# Patient Record
Sex: FEMALE | Race: WHITE | NOT HISPANIC OR LATINO | Employment: OTHER | URBAN - METROPOLITAN AREA
[De-identification: names, ages, dates, MRNs, and addresses within clinical notes are randomized per-mention and may not be internally consistent; named-entity substitution may affect disease eponyms.]

---

## 2017-01-18 ENCOUNTER — TELEPHONE (OUTPATIENT)
Dept: OPHTHALMOLOGY | Facility: CLINIC | Age: 72
End: 2017-01-18

## 2017-01-18 DIAGNOSIS — H25.12 NUCLEAR SCLEROTIC CATARACT OF LEFT EYE: Primary | ICD-10-CM

## 2017-01-27 ENCOUNTER — OFFICE VISIT (OUTPATIENT)
Dept: OPHTHALMOLOGY | Facility: CLINIC | Age: 72
End: 2017-01-27
Payer: MEDICARE

## 2017-01-27 DIAGNOSIS — H40.033 ANATOMICAL NARROW ANGLE, BILATERAL: ICD-10-CM

## 2017-01-27 DIAGNOSIS — H57.03 SMALL PUPIL: ICD-10-CM

## 2017-01-27 DIAGNOSIS — Z96.1 PSEUDOPHAKIA OF RIGHT EYE: ICD-10-CM

## 2017-01-27 DIAGNOSIS — Z98.890 H/O LASER IRIDOTOMY: ICD-10-CM

## 2017-01-27 DIAGNOSIS — H52.03 HYPEROPIA WITH PRESBYOPIA OF BOTH EYES: ICD-10-CM

## 2017-01-27 DIAGNOSIS — H52.4 HYPEROPIA WITH PRESBYOPIA OF BOTH EYES: ICD-10-CM

## 2017-01-27 DIAGNOSIS — H25.12 NUCLEAR SCLEROTIC CATARACT OF LEFT EYE: Primary | ICD-10-CM

## 2017-01-27 DIAGNOSIS — Q11.2 NANOPHTHALMOS, BILATERAL: ICD-10-CM

## 2017-01-27 DIAGNOSIS — H25.12 SENILE NUCLEAR SCLEROSIS, LEFT: ICD-10-CM

## 2017-01-27 DIAGNOSIS — H52.31 ANISOMETROPIA: ICD-10-CM

## 2017-01-27 PROCEDURE — 99212 OFFICE O/P EST SF 10 MIN: CPT | Mod: PBBFAC | Performed by: OPHTHALMOLOGY

## 2017-01-27 PROCEDURE — 92136 OPHTHALMIC BIOMETRY: CPT | Mod: PBBFAC,LT | Performed by: OPHTHALMOLOGY

## 2017-01-27 PROCEDURE — 99499 UNLISTED E&M SERVICE: CPT | Mod: S$PBB,,, | Performed by: OPHTHALMOLOGY

## 2017-01-27 PROCEDURE — 99999 PR PBB SHADOW E&M-EST. PATIENT-LVL II: CPT | Mod: PBBFAC,,, | Performed by: OPHTHALMOLOGY

## 2017-01-27 NOTE — PROGRESS NOTES
HPI     DLS: 8/05/16    Pt here for Pre-Op phaco w/IOL OS- (Surgery is 2/08/17)    Meds: No GTTS    1. Anatomical narrow angle, bilateral  2. Senile nuclear sclerosis, left  3. Hyperopia with presbyopia, both eyes  4. Anisometropia  5. H/O laser iridotomy        Last edited by Faith Moeller on 1/27/2017  1:42 PM.         Assessment /Plan     For exam results, see Encounter Report.    Nuclear sclerotic cataract of left eye    Pseudophakia of right eye    Here for pre-op - complex phaco/IOL OS - very shallow AC / small pupil // poor red reflex// Loraine ring // trypan blue   AL is only 20.28 - almost nan opthalmic       Status post cataract extraction and insertion of intraocular lens of right eye - 7/22/2016 // PCB00 29.5   had more pain than ususal in the first post op week 2/2 carneal abrasion -   + anterior capsule phymosis - OD - consider yag cap to superior area as it may be causing some decentration  - with dilation can see ant capsule is intact and more constricted superorly than inferiorly        H/o Anatomical narrow angles OU s/p LPI, patent ou  - angles open to PTM with central shallowing 2/2 cataract  - IOP okay on no drops, never been high  - no synechiae  - LPI OD 5/6/2004, OS 5/13/2004  - CDR 0.2 OU in 2006  - HVF 2005 od full, os ?INS (unreliable)      NSC OS-  -patient c/o glare, BAT 20/80 od // 20/70 os     IOL calc -   ? nanopthalimic eye   AL  - 20.71 od and 20.28 os     OS - even shorter than od ((od has a 29.5 PCB00) ) // may need to special order the lens   IOL calculations OS   PCB00 31.5  AC IOL 25.0    Ok to take her ususal anxiety meds     Pt had increase BP pre-op in holding area when first eye done  - was ok in surgery and post op     OCT macula nl ou - 8/5/2016     I have seen and personally examined the patient.  I agree with the findings, assessment and plan of the resident and/or fellow.     Bela Moody MD

## 2017-02-01 RX ORDER — MOXIFLOXACIN 5 MG/ML
1 SOLUTION/ DROPS OPHTHALMIC
Status: CANCELLED | OUTPATIENT
Start: 2017-02-01

## 2017-02-01 RX ORDER — SODIUM CHLORIDE 9 MG/ML
INJECTION, SOLUTION INTRAVENOUS CONTINUOUS
Status: CANCELLED | OUTPATIENT
Start: 2017-02-01

## 2017-02-01 RX ORDER — PHENYLEPHRINE HYDROCHLORIDE 100 MG/ML
1 SOLUTION/ DROPS OPHTHALMIC
Status: CANCELLED | OUTPATIENT
Start: 2017-02-01

## 2017-02-01 RX ORDER — TROPICAMIDE 10 MG/ML
1 SOLUTION/ DROPS OPHTHALMIC
Status: CANCELLED | OUTPATIENT
Start: 2017-02-01

## 2017-02-01 RX ORDER — LIDOCAINE HYDROCHLORIDE 10 MG/ML
1 INJECTION, SOLUTION EPIDURAL; INFILTRATION; INTRACAUDAL; PERINEURAL ONCE
Status: CANCELLED | OUTPATIENT
Start: 2017-02-01 | End: 2017-02-01

## 2017-02-01 RX ORDER — KETOROLAC TROMETHAMINE 5 MG/ML
1 SOLUTION OPHTHALMIC
Status: CANCELLED | OUTPATIENT
Start: 2017-02-01

## 2017-02-01 NOTE — H&P
Subjective:       Patient ID: Manasa Goodrich is a 72 y.o. female.    Chief Complaint: Pre-op Exam      Past Medical History   Diagnosis Date    Cataract     Glaucoma      Past Surgical History   Procedure Laterality Date    Laser peripheral iridotomy Bilateral 2004         Cataract extraction w/  intraocular lens implant Right 06/22/2016          Family History   Problem Relation Age of Onset    Stroke Father     Cancer Father     Cancer Daughter     Amblyopia Neg Hx     Blindness Neg Hx     Cataracts Neg Hx     Diabetes Neg Hx     Glaucoma Neg Hx     Hypertension Neg Hx     Macular degeneration Neg Hx     Retinal detachment Neg Hx     Strabismus Neg Hx     Thyroid disease Neg Hx      Social History     Social History    Marital status:      Spouse name: N/A    Number of children: N/A    Years of education: N/A     Social History Main Topics    Smoking status: Never Smoker    Smokeless tobacco: None    Alcohol use Yes      Comment: social    Drug use: No    Sexual activity: Not Asked     Other Topics Concern    None     Social History Narrative       Current Outpatient Prescriptions   Medication Sig Dispense Refill    ascorbic acid (VITAMIN C) 500 MG tablet Take 500 mg by mouth once daily.        b complex vitamins capsule Take 1 capsule by mouth once daily.      calcium carbonate 220 mg capsule Take 250 mg by mouth 2 (two) times daily with meals.        ergocalciferol (VITAMIN D2) 50,000 unit Cap Take 50,000 Units by mouth every 7 days.        meloxicam (MOBIC) 15 MG tablet Take 15 mg by mouth once daily.       prednisoLONE acetate (PRED FORTE) 1 % DrpS Place 1 drop into the right eye 4 (four) times daily. (Patient taking differently: Place 1 drop into the right eye once daily. ) 1 Bottle 1    sertraline (ZOLOFT) 50 MG tablet Take 50 mg by mouth once daily.       tetracycline (ACHROMYCIN,SUMYCIN) 250 MG capsule Take 50 mg by mouth once daily.       No  current facility-administered medications for this visit.      Review of patient's allergies indicates:  No Known Allergies    Review of Systems   Constitutional: Negative.    HENT: Negative.    Eyes: Positive for visual disturbance.   Respiratory: Negative.    Cardiovascular: Negative.    Neurological: Negative.    Psychiatric/Behavioral: Negative.        Objective:      There were no vitals filed for this visit.  Physical Exam   Constitutional: She is oriented to person, place, and time. She appears well-developed and well-nourished.   HENT:   Head: Normocephalic and atraumatic.   Eyes:   See eye exam   Cardiovascular: Normal rate.    Pulmonary/Chest: Effort normal.   Neurological: She is alert and oriented to person, place, and time.   Skin: Skin is warm and dry.   Psychiatric: She has a normal mood and affect.            Assessment:       1. Nuclear sclerotic cataract of left eye    2. Senile nuclear sclerosis, left    3. Anatomical narrow angle, bilateral    4. Pseudophakia of right eye    5. Anisometropia    6. Hyperopia with presbyopia of both eyes    7. Small pupil    8. H/O laser iridotomy        Plan:       Complex - phaco/IOL os - 2nd eye // very short eye // Loraine ring and trypan blue // PCB00 31.5

## 2017-02-08 ENCOUNTER — HOSPITAL ENCOUNTER (OUTPATIENT)
Facility: HOSPITAL | Age: 72
Discharge: HOME | End: 2017-02-08
Attending: OPHTHALMOLOGY | Admitting: OPHTHALMOLOGY
Payer: MEDICARE

## 2017-02-08 ENCOUNTER — ANESTHESIA (OUTPATIENT)
Dept: SURGERY | Facility: HOSPITAL | Age: 72
End: 2017-02-08
Payer: MEDICARE

## 2017-02-08 ENCOUNTER — SURGERY (OUTPATIENT)
Age: 72
End: 2017-02-08

## 2017-02-08 ENCOUNTER — ANESTHESIA EVENT (OUTPATIENT)
Dept: SURGERY | Facility: HOSPITAL | Age: 72
End: 2017-02-08
Payer: MEDICARE

## 2017-02-08 VITALS
HEIGHT: 61 IN | RESPIRATION RATE: 20 BRPM | DIASTOLIC BLOOD PRESSURE: 62 MMHG | SYSTOLIC BLOOD PRESSURE: 142 MMHG | HEART RATE: 66 BPM | BODY MASS INDEX: 24.55 KG/M2 | TEMPERATURE: 98 F | WEIGHT: 130 LBS | OXYGEN SATURATION: 100 %

## 2017-02-08 DIAGNOSIS — H25.12 NUCLEAR SCLEROTIC CATARACT OF LEFT EYE: Primary | ICD-10-CM

## 2017-02-08 DIAGNOSIS — H25.12 SENILE NUCLEAR SCLEROSIS, LEFT: ICD-10-CM

## 2017-02-08 DIAGNOSIS — Q11.2 NANOPHTHALMOS, BILATERAL: ICD-10-CM

## 2017-02-08 DIAGNOSIS — H57.03 SMALL PUPIL: ICD-10-CM

## 2017-02-08 PROCEDURE — 25000003 PHARM REV CODE 250

## 2017-02-08 PROCEDURE — 63600175 PHARM REV CODE 636 W HCPCS: Performed by: OPHTHALMOLOGY

## 2017-02-08 PROCEDURE — 71000016 HC POSTOP RECOV ADDL HR: Performed by: OPHTHALMOLOGY

## 2017-02-08 PROCEDURE — 25000003 PHARM REV CODE 250: Performed by: OPHTHALMOLOGY

## 2017-02-08 PROCEDURE — D9220A PRA ANESTHESIA: Mod: CRNA,,, | Performed by: NURSE ANESTHETIST, CERTIFIED REGISTERED

## 2017-02-08 PROCEDURE — C9447 INJ, PHENYLEPHRINE KETOROLAC: HCPCS | Performed by: OPHTHALMOLOGY

## 2017-02-08 PROCEDURE — 71000015 HC POSTOP RECOV 1ST HR: Performed by: OPHTHALMOLOGY

## 2017-02-08 PROCEDURE — 36000706: Performed by: OPHTHALMOLOGY

## 2017-02-08 PROCEDURE — 37000009 HC ANESTHESIA EA ADD 15 MINS: Performed by: OPHTHALMOLOGY

## 2017-02-08 PROCEDURE — D9220A PRA ANESTHESIA: Mod: ANES,,, | Performed by: ANESTHESIOLOGY

## 2017-02-08 PROCEDURE — 37000008 HC ANESTHESIA 1ST 15 MINUTES: Performed by: OPHTHALMOLOGY

## 2017-02-08 PROCEDURE — 63600175 PHARM REV CODE 636 W HCPCS: Performed by: NURSE ANESTHETIST, CERTIFIED REGISTERED

## 2017-02-08 PROCEDURE — 99499 UNLISTED E&M SERVICE: CPT | Mod: ,,, | Performed by: OPHTHALMOLOGY

## 2017-02-08 PROCEDURE — 36000707: Performed by: OPHTHALMOLOGY

## 2017-02-08 PROCEDURE — V2632 POST CHMBR INTRAOCULAR LENS: HCPCS | Performed by: OPHTHALMOLOGY

## 2017-02-08 PROCEDURE — 66982 XCAPSL CTRC RMVL CPLX WO ECP: CPT | Mod: LT,,, | Performed by: OPHTHALMOLOGY

## 2017-02-08 DEVICE — IMPLANTABLE DEVICE: Type: IMPLANTABLE DEVICE | Site: EYE | Status: FUNCTIONAL

## 2017-02-08 RX ORDER — ACETAZOLAMIDE 500 MG/1
500 CAPSULE, EXTENDED RELEASE ORAL ONCE
Status: COMPLETED | OUTPATIENT
Start: 2017-02-08 | End: 2017-02-08

## 2017-02-08 RX ORDER — ACETAZOLAMIDE 500 MG/1
CAPSULE, EXTENDED RELEASE ORAL
Status: DISCONTINUED
Start: 2017-02-08 | End: 2017-02-08 | Stop reason: HOSPADM

## 2017-02-08 RX ORDER — MOXIFLOXACIN 5 MG/ML
SOLUTION/ DROPS OPHTHALMIC
Status: DISCONTINUED | OUTPATIENT
Start: 2017-02-08 | End: 2017-02-08 | Stop reason: HOSPADM

## 2017-02-08 RX ORDER — ACETAMINOPHEN 325 MG/1
650 TABLET ORAL EVERY 6 HOURS PRN
Status: DISCONTINUED | OUTPATIENT
Start: 2017-02-08 | End: 2017-02-08 | Stop reason: HOSPADM

## 2017-02-08 RX ORDER — ACETAMINOPHEN 325 MG/1
TABLET ORAL
Status: COMPLETED
Start: 2017-02-08 | End: 2017-02-08

## 2017-02-08 RX ORDER — SODIUM CHLORIDE 9 MG/ML
INJECTION, SOLUTION INTRAVENOUS CONTINUOUS
Status: DISCONTINUED | OUTPATIENT
Start: 2017-02-08 | End: 2017-02-08 | Stop reason: HOSPADM

## 2017-02-08 RX ORDER — MOXIFLOXACIN 5 MG/ML
1 SOLUTION/ DROPS OPHTHALMIC
Status: DISCONTINUED | OUTPATIENT
Start: 2017-02-08 | End: 2017-02-08 | Stop reason: HOSPADM

## 2017-02-08 RX ORDER — TROPICAMIDE 10 MG/ML
1 SOLUTION/ DROPS OPHTHALMIC
Status: DISCONTINUED | OUTPATIENT
Start: 2017-02-08 | End: 2017-02-08 | Stop reason: HOSPADM

## 2017-02-08 RX ORDER — LIDOCAINE HYDROCHLORIDE 10 MG/ML
1 INJECTION, SOLUTION EPIDURAL; INFILTRATION; INTRACAUDAL; PERINEURAL ONCE
Status: COMPLETED | OUTPATIENT
Start: 2017-02-08 | End: 2017-02-08

## 2017-02-08 RX ORDER — PHENYLEPHRINE HYDROCHLORIDE 100 MG/ML
1 SOLUTION/ DROPS OPHTHALMIC
Status: DISCONTINUED | OUTPATIENT
Start: 2017-02-08 | End: 2017-02-08 | Stop reason: HOSPADM

## 2017-02-08 RX ORDER — LIDOCAINE HYDROCHLORIDE 20 MG/ML
JELLY TOPICAL
Status: DISCONTINUED | OUTPATIENT
Start: 2017-02-08 | End: 2017-02-08 | Stop reason: HOSPADM

## 2017-02-08 RX ORDER — MANNITOL 20 G/100ML
INJECTION, SOLUTION INTRAVENOUS
Status: COMPLETED
Start: 2017-02-08 | End: 2017-02-08

## 2017-02-08 RX ORDER — LIDOCAINE HYDROCHLORIDE 10 MG/ML
INJECTION, SOLUTION EPIDURAL; INFILTRATION; INTRACAUDAL; PERINEURAL
Status: DISCONTINUED | OUTPATIENT
Start: 2017-02-08 | End: 2017-02-08 | Stop reason: HOSPADM

## 2017-02-08 RX ORDER — PREDNISOLONE ACETATE 10 MG/ML
SUSPENSION/ DROPS OPHTHALMIC
Status: DISCONTINUED | OUTPATIENT
Start: 2017-02-08 | End: 2017-02-08 | Stop reason: HOSPADM

## 2017-02-08 RX ORDER — MIDAZOLAM HYDROCHLORIDE 1 MG/ML
INJECTION, SOLUTION INTRAMUSCULAR; INTRAVENOUS
Status: DISCONTINUED | OUTPATIENT
Start: 2017-02-08 | End: 2017-02-08

## 2017-02-08 RX ORDER — FENTANYL CITRATE 50 UG/ML
INJECTION, SOLUTION INTRAMUSCULAR; INTRAVENOUS
Status: DISCONTINUED | OUTPATIENT
Start: 2017-02-08 | End: 2017-02-08

## 2017-02-08 RX ORDER — KETOROLAC TROMETHAMINE 5 MG/ML
1 SOLUTION OPHTHALMIC
Status: DISCONTINUED | OUTPATIENT
Start: 2017-02-08 | End: 2017-02-08 | Stop reason: HOSPADM

## 2017-02-08 RX ORDER — LIDOCAINE HYDROCHLORIDE 40 MG/ML
INJECTION, SOLUTION RETROBULBAR
Status: DISCONTINUED | OUTPATIENT
Start: 2017-02-08 | End: 2017-02-08 | Stop reason: HOSPADM

## 2017-02-08 RX ADMIN — PHENYLEPHRINE AND KETOROLAC 4 ML: 10.16; 2.88 INJECTION, SOLUTION, CONCENTRATE INTRAOCULAR at 11:02

## 2017-02-08 RX ADMIN — LIDOCAINE HYDROCHLORIDE 1 MG: 10 INJECTION, SOLUTION EPIDURAL; INFILTRATION; INTRACAUDAL; PERINEURAL at 10:02

## 2017-02-08 RX ADMIN — FENTANYL CITRATE 50 MCG: 50 INJECTION, SOLUTION INTRAMUSCULAR; INTRAVENOUS at 11:02

## 2017-02-08 RX ADMIN — MOXIFLOXACIN HYDROCHLORIDE 6 DROP: 5 SOLUTION/ DROPS OPHTHALMIC at 11:02

## 2017-02-08 RX ADMIN — MANNITOL 40 G: 20 INJECTION, SOLUTION INTRAVENOUS at 11:02

## 2017-02-08 RX ADMIN — ACETYLCHOLINE CHLORIDE 2 ML: KIT at 11:02

## 2017-02-08 RX ADMIN — SODIUM CHONDROITIN SULFATE / SODIUM HYALURONATE 1.05 ML: 0.55-0.5 INJECTION INTRAOCULAR at 11:02

## 2017-02-08 RX ADMIN — PHENYLEPHRINE HYDROCHLORIDE 1 DROP: 100 SOLUTION/ DROPS OPHTHALMIC at 10:02

## 2017-02-08 RX ADMIN — TROPICAMIDE 1 DROP: 10 SOLUTION/ DROPS OPHTHALMIC at 10:02

## 2017-02-08 RX ADMIN — LIDOCAINE HYDROCHLORIDE 1 ML: 10 INJECTION, SOLUTION EPIDURAL; INFILTRATION; INTRACAUDAL; PERINEURAL at 11:02

## 2017-02-08 RX ADMIN — SODIUM CHLORIDE: 0.9 INJECTION, SOLUTION INTRAVENOUS at 10:02

## 2017-02-08 RX ADMIN — KETOROLAC TROMETHAMINE 1 DROP: 5 SOLUTION OPHTHALMIC at 10:02

## 2017-02-08 RX ADMIN — ACETAZOLAMIDE 500 MG: 500 CAPSULE, EXTENDED RELEASE ORAL at 12:02

## 2017-02-08 RX ADMIN — LIDOCAINE HYDROCHLORIDE 5 ML: 40 INJECTION, SOLUTION RETROBULBAR; TOPICAL at 11:02

## 2017-02-08 RX ADMIN — LIDOCAINE HYDROCHLORIDE 2 ML: 20 JELLY TOPICAL at 11:02

## 2017-02-08 RX ADMIN — TRYPAN BLUE 0.5 ML: 0.3 INJECTION, SOLUTION INTRAOCULAR; OPHTHALMIC at 11:02

## 2017-02-08 RX ADMIN — PREDNISOLONE ACETATE 5 DROP: 10 SUSPENSION/ DROPS OPHTHALMIC at 11:02

## 2017-02-08 RX ADMIN — MIDAZOLAM HYDROCHLORIDE 2 MG: 1 INJECTION, SOLUTION INTRAMUSCULAR; INTRAVENOUS at 11:02

## 2017-02-08 RX ADMIN — ACETAMINOPHEN 650 MG: 325 TABLET ORAL at 12:02

## 2017-02-08 RX ADMIN — ACETAMINOPHEN 650 MG: 325 TABLET, FILM COATED ORAL at 12:02

## 2017-02-08 RX ADMIN — MOXIFLOXACIN HYDROCHLORIDE 1 DROP: 5 SOLUTION/ DROPS OPHTHALMIC at 10:02

## 2017-02-08 NOTE — DISCHARGE SUMMARY
Date of Procedure / Discharge: 02/08/2017     PreOp Diagnosis: Complex Cataract OS      PostOp Diagnosis:as above s/p procedure    Procedure:Complex Cataract Extraction with Phacoemulsification and trypan blue and Malyugin Ring w/ Posterior Chamber IOL Implantation    Attending:Bela Moody MD    Assistant:Stacey Roberto MD      Anesthesia:Local MAC    Complications:: None    Blood loss: <5 CC    Specimens: none    Procedure Details:  See Dictation      -D/C home when patient meets anesthesia requirements  -Follow up tomorrow with surgeon in the [facility].

## 2017-02-08 NOTE — ANESTHESIA PREPROCEDURE EVALUATION
02/08/2017  Manasa Goodrich is a 72 y.o., female.    [facility] Anesthesia Evaluation         Review of Systems  Anesthesia Hx:  No problems with previous Anesthesia   Social:  Non-Smoker, No Alcohol Use    Cardiovascular:   Exercise tolerance: good    Pulmonary:  Pulmonary Normal    Hepatic/GI:  Hepatic/GI Normal        Physical Exam  General:  Well nourished    Airway/Jaw/Neck:  Airway Findings: Mouth Opening: Normal Tongue: Normal  General Airway Assessment: Adult  Mallampati: II  Improves to II with phonation.  TM Distance: Normal, at least 6 cm      Dental:  Dental Findings: In tact   Chest/Lungs:  Chest/Lungs Findings: Clear to auscultation         Mental Status:  Mental Status Findings:  Cooperative         Anesthesia Plan  Type of Anesthesia, risks & benefits discussed:  Anesthesia Type:  general  Patient's Preference:   Intra-op Monitoring Plan:   Intra-op Monitoring Plan Comments:   Post Op Pain Control Plan:   Post Op Pain Control Plan Comments:   Induction:   IV  Beta Blocker:  Patient is not currently on a Beta-Blocker (No further documentation required).       Informed Consent: Patient understands risks and agrees with Anesthesia plan.  Questions answered. Anesthesia consent signed with patient.  ASA Score: 2     Day of Surgery Review of History & Physical:    H&P update referred to the surgeon.         Ready For Surgery From Anesthesia Perspective.

## 2017-02-08 NOTE — IP AVS SNAPSHOT
[facility]  [address]  [facility] [address]  Phone: [phone]           Patient Discharge Instructions     Our goal is to set you up for success. This packet includes information on your condition, medications, and your home care. It will help you to care for yourself so you don't get sicker and need to go back to the hospital.     Please ask your nurse if you have any questions.        There are many details to remember when preparing to leave the hospital. Here is what you will need to do:    1. Take your medicine. If you are prescribed medications, review your Medication List in the following pages. You may have new medications to  at the pharmacy and others that you'll need to stop taking. Review the instructions for how and when to take your medications. Talk with your doctor or nurses if you are unsure of what to do.     2. Go to your follow-up appointments. Specific follow-up information is listed in the following pages. Your may be contacted by a transition nurse or clinical provider about future appointments. Be sure we have all of the phone numbers to reach you, if needed. Please contact your provider's office if you are unable to make an appointment.     3. Watch for warning signs. Your doctor or nurse will give you detailed warning signs to watch for and when to call for assistance. These instructions may also include educational information about your condition. If you experience any of warning signs to your health, call your doctor.               [facility]  Unless otherwise directed by your provider, please contact [facility], our nurse care line that is available for 24/7 assistance.     [phone] (toll-free)    Registered nurses in the [facility] provide clinical advisement, health education, appointment booking, and other advisory services.                    ** Verify the list of medication(s) below is accurate and up  to date. Carry this with you in case of emergency. If your medications have changed, please notify your healthcare provider.             Medication List      CHANGE how you take these medications        Additional Info                      prednisoLONE acetate 1 % Drps   Commonly known as:  PRED FORTE   Quantity:  1 Bottle   Refills:  1   Dose:  1 drop   What changed:  when to take this    Last time this was given:  5 drops on 2/8/2017 11:44 AM   Instructions:  Place 1 drop into the right eye 4 (four) times daily.     Begin Date    AM    Noon    PM    Bedtime         CONTINUE taking these medications        Additional Info                      ascorbic acid (vitamin C) 500 MG tablet   Commonly known as:  VITAMIN C   Refills:  0   Dose:  500 mg    Instructions:  Take 500 mg by mouth once daily.     Begin Date    AM    Noon    PM    Bedtime       b complex vitamins capsule   Refills:  0   Dose:  1 capsule    Instructions:  Take 1 capsule by mouth once daily.     Begin Date    AM    Noon    PM    Bedtime       calcium carbonate 220 mg capsule   Refills:  0   Dose:  250 mg    Instructions:  Take 250 mg by mouth 2 (two) times daily with meals.     Begin Date    AM    Noon    PM    Bedtime       meloxicam 15 MG tablet   Commonly known as:  MOBIC   Refills:  0   Dose:  15 mg    Instructions:  Take 15 mg by mouth once daily.     Begin Date    AM    Noon    PM    Bedtime       tetracycline 250 MG capsule   Commonly known as:  ACHROMYCIN,SUMYCIN   Refills:  0   Dose:  50 mg   Indications:  Acne Rosacea    Instructions:  Take 50 mg by mouth once daily.     Begin Date    AM    Noon    PM    Bedtime       VITAMIN D2 50,000 unit Cap   Refills:  0   Dose:  08805 Units   Generic drug:  ergocalciferol    Instructions:  Take 50,000 Units by mouth every 7 days.     Begin Date    AM    Noon    PM    Bedtime                  Please bring to all follow up appointments:    1. A copy of your discharge instructions.  2. All medicines you  are currently taking in their original bottles.  3. Identification and insurance card.    Please arrive 15 minutes ahead of scheduled appointment time.    Please call 24 hours in advance if you must reschedule your appointment and/or time.        Your Scheduled Appointments     Feb 09, 2017  8:15 AM CST   Post OP with Bela Moody MD   [facility]    [address]  [facility] [id number] [phone]              Follow-up Information     Follow up with Bela Moody MD In 1 day.    Specialty:  Ophthalmology    Contact information:    [address]  [facility] [id number] [phone]          Discharge Instructions     Future Orders    Activity as tolerated     Diet general     Questions:    Total calories:      Fat restriction, if any:      Protein restriction, if any:      Na restriction, if any:      Fluid restriction:      Additional restrictions:      Leave dressing on - Keep it clean, dry, and intact until clinic visit         Discharge Instructions       Home Care Instructions  CATARACT SURGERY    ACTIVITY LEVEL:  If you received sedation or an anesthetic, you may feel sleepy for several hours. Rest until you are more awake. Gradually resume your normal activities.    RESTRICTIONS - for the next 7 days   Do not lift anything over 10 pounds.   When bending, bend at the knees not the waist.   Do not rub the eye.   Do not get water in the eye.   Do not sleep on the side that had surgery.   Protect your eye at bedtime with the shield provided.    DIET:  At home, continue with liquids, and if there is no nausea, you may eat a soft diet. Gradually resume your normal diet.    BATHING:  You may shower or take a bath. Protect your eye with the shield provided.    MEDICATIONS:  You will receive instructions for any pain and/or antibiotic prescriptions. Pain medication should be taken only if needed and as directed. Antibiotics should be taken as directed  "until the entire prescription is completed.    EYE CARE:  1. Protect your eye at all times for the first month after surgery. Your old prescription glasses or sunglasses may be worn during the day. Any time the glasses are removed (ANYTIME, even while bathing or showering) wear the protective shield instead, especially at night during sleep.  2. Your doctor will tell you when to start using the eye drops. Use them as directed. They will help decrease the normal postoperative reaction and tenderness and help prevent infection.  3. The eye will be sensitive to light and glare for several weeks. This may produce a headache, secondarily. However, the eye itself should not be very painful, and taking Tylenol should relieve this.  Avoid aspirin products for the first few days after surgery, if possible, as these produce a bleeding tendency.    WHEN TO CALL THE DOCTOR:   If your eye becomes more painful or more red than when you left the hospital   For any significant decrease in vision    FOR EMERGENCIES:  If any unusual problems or difficulties occur, contact Dr. Bela Moody or the resident at [phone] (page ) or at the Clinic office, [phone].        Primary Diagnosis     Your primary diagnosis was:  Cataract Of Left Eye      Admission Information     Date & Time Provider Department CSN    2/8/2017  9:28 AM Bela Moody MD [facility] [id number]      Care Providers     Provider Role Specialty Primary office phone    Bela Moody MD Attending Provider Ophthalmology [phone]    Bela Moody MD Surgeon  Ophthalmology [phone]      Your Vitals Were     BP Pulse Temp Resp Height Weight    148/68 (BP Location: Left arm, Patient Position: Lying, BP Method: Automatic) 71 97.7 °F (36.5 °C) (Oral) 18 5' 1" (1.549 m) 59 kg (130 lb)    SpO2 BMI             100% 24.56 kg/m2         Recent Lab Values     No lab values to display.      Allergies as of " 2/8/2017     No Known Allergies      Advance Directives     An advance directive is a document which, in the event you are no longer able to make decisions for yourself, tells your healthcare team what kind of treatment you do or do not want to receive, or who you would like to make those decisions for you.  If you do not currently have an advance directive, [facility] encourages you to create one.  For more information call:  [phone]), [phone] ([phone]),  or log on to [email or web address].        Language Assistance Services     ATTENTION: Language assistance services are available, free of charge. Please call [phone].      ATENCIÓN: [place], tiene a lamas disposición servicios gratuitos de asistencia lingüística. Llame al [phone].     [facility] Ý: N?u b?n nói Ti?ng Vi?t, có các d?ch v? h? tr? ngôn ng? mi?n phí dành cho b?n. G?i s? [phone].         [facility] complies with applicable Federal civil rights laws and does not discriminate on the basis of race, color, national origin, age, disability, or sex.

## 2017-02-08 NOTE — ANESTHESIA POSTPROCEDURE EVALUATION
"Anesthesia Post Evaluation    Patient: Manasa Goodrich    Procedure(s) Performed: Procedure(s) (LRB):  PHACOEMULSIFICATION-ASPIRATION-CATARACT/COMPLEX (Left)  INSERTION-INTRAOCULAR LENS (IOL) (Left)    Final Anesthesia Type: MAC  Patient location during evaluation: PACU  Patient participation: Yes- Able to Participate  Level of consciousness: awake and alert  Post-procedure vital signs: reviewed and stable  Pain management: adequate  Airway patency: patent  PONV status at discharge: No PONV  Anesthetic complications: no      Cardiovascular status: blood pressure returned to baseline  Respiratory status: unassisted  Hydration status: euvolemic  Follow-up not needed.        Visit Vitals    /76    Pulse 71    Temp 36.5 °C (97.7 °F) (Oral)    Resp 18    Ht 5' 1" (1.549 m)    Wt 59 kg (130 lb)    SpO2 100%    Breastfeeding No    BMI 24.56 kg/m2       Pain/Cisco Score: Pain Assessment Performed: Yes (2/8/2017 12:09 PM)  Presence of Pain: denies (2/8/2017 12:09 PM)  Pain Rating Prior to Med Admin: 3 (2/8/2017 12:18 PM)      "

## 2017-02-08 NOTE — ANESTHESIA RELEASE NOTE
"Anesthesia Release from PACU Note    Patient: Manasa Goodrich    Procedure(s) Performed: Procedure(s) (LRB):  PHACOEMULSIFICATION-ASPIRATION-CATARACT/COMPLEX (Left)  INSERTION-INTRAOCULAR LENS (IOL) (Left)    Anesthesia type: MAC    Post pain: Adequate analgesia    Post assessment: no apparent anesthetic complications    Last Vitals:   Visit Vitals    /76    Pulse 71    Temp 36.5 °C (97.7 °F) (Oral)    Resp 18    Ht 5' 1" (1.549 m)    Wt 59 kg (130 lb)    SpO2 100%    Breastfeeding No    BMI 24.56 kg/m2       Post vital signs: stable    Level of consciousness: awake, alert  and oriented    Nausea/Vomiting: no nausea/no vomiting    Complications: none    Airway Patency: patent    Respiratory: unassisted    Cardiovascular: stable and blood pressure at baseline    Hydration: euvolemic  "

## 2017-02-08 NOTE — PROGRESS NOTES
Plan of care reviewed with pt & spouse, both verbalized understanding, pt progressing with plan of care, denies nausea, pain well controlled, tolerating PO, reviewed all DC instructions, home meds, when to call MD, when to follow-up, answered questions.

## 2017-02-08 NOTE — TRANSFER OF CARE
"Anesthesia Transfer of Care Note    Patient: Manasa Goodrich    Procedure(s) Performed: Procedure(s) (LRB):  PHACOEMULSIFICATION-ASPIRATION-CATARACT/COMPLEX (Left)  INSERTION-INTRAOCULAR LENS (IOL) (Left)    Patient location: PACU    Anesthesia Type: MAC    Transport from OR: Transported from OR on room air with adequate spontaneous ventilation    Post pain: adequate analgesia    Post assessment: no apparent anesthetic complications    Post vital signs: stable    Level of consciousness: awake and alert    Nausea/Vomiting: no nausea/vomiting    Complications: none          Last vitals:   Visit Vitals    BP (!) 158/72 (BP Location: Left arm, Patient Position: Lying, BP Method: Automatic)    Pulse 77    Temp 36.7 °C (98.1 °F) (Oral)    Resp 18    Ht 5' 1" (1.549 m)    Wt 59 kg (130 lb)    SpO2 100%    Breastfeeding No    BMI 24.56 kg/m2     "

## 2017-02-08 NOTE — DISCHARGE INSTRUCTIONS
Home Care Instructions  CATARACT SURGERY    ACTIVITY LEVEL:  If you received sedation or an anesthetic, you may feel sleepy for several hours. Rest until you are more awake. Gradually resume your normal activities.    RESTRICTIONS - for the next 7 days   Do not lift anything over 10 pounds.   When bending, bend at the knees not the waist.   Do not rub the eye.   Do not get water in the eye.   Do not sleep on the side that had surgery.   Protect your eye at bedtime with the shield provided.    DIET:  At home, continue with liquids, and if there is no nausea, you may eat a soft diet. Gradually resume your normal diet.    BATHING:  You may shower or take a bath. Protect your eye with the shield provided.    MEDICATIONS:  You will receive instructions for any pain and/or antibiotic prescriptions. Pain medication should be taken only if needed and as directed. Antibiotics should be taken as directed until the entire prescription is completed.    EYE CARE:  1. Protect your eye at all times for the first month after surgery. Your old prescription glasses or sunglasses may be worn during the day. Any time the glasses are removed (ANYTIME, even while bathing or showering) wear the protective shield instead, especially at night during sleep.  2. Your doctor will tell you when to start using the eye drops. Use them as directed. They will help decrease the normal postoperative reaction and tenderness and help prevent infection.  3. The eye will be sensitive to light and glare for several weeks. This may produce a headache, secondarily. However, the eye itself should not be very painful, and taking Tylenol should relieve this.  Avoid aspirin products for the first few days after surgery, if possible, as these produce a bleeding tendency.    WHEN TO CALL THE DOCTOR:   If your eye becomes more painful or more red than when you left the hospital   For any significant decrease in vision    FOR EMERGENCIES:  If any unusual  problems or difficulties occur, contact Dr. Bela Moody or the resident at [phone] (page ) or at the Clinic office, [phone].

## 2017-02-08 NOTE — OP NOTE
DATE OF PROCEDURE: 02/08/2017    PREOPERATIVE DIAGNOSIS: Complex/small pupil/floppy iris Cataract of the OS. Nuclear sclerotic cataract of left eye     POSTOPERATIVE DIAGNOSIS: Complex/small pupil/floppy iris Cataract of theOS, status post procedure. Nuclear sclerotic cataract of left eye    PROCEDURE PERFORMED: Complex Cataract extraction with trypan blue and with Malyugin ring ,phacoemulsification, and posterior chamber intraocular lens placement.     SURGEON: Bela Moody M.D.     ASSISTANT: Stacey Roe MD     COMPLICATIONS: None.     BLOOD LOSS: Less than 5 mL.     ANESTHESIA: Local MAC    IMPLANT DATA:   1. Stover PCB00 , power 31.5 diopters, serial #[id number]    PROCEDURE IN DETAIL: After informed consent including risks, benefits, alternatives, the patient was brought to the operating room table, placed in supine position. Monitored anesthesia care was used throughout the entire procedure. Once adequate anesthesia was confirmed, the patient was then prepped and draped in usual sterile fashion for intraocular surgery. Manitol 40 grams was given IV in attempt to minimize posterior pressure in this very small eye. A Wire speculum was used to hold the eyelids apart and the procedure was initiated by making  a paracentesis incision. Intracameral lidocaine followed by trypan blue, followed by  Viscoat were placed in the anterior chamber. A 2.4 mm blade was then used to make to complete the clear corneal incision temporally. A Malyugin ring was inserted to dilated and maintain pupillary dilation.  A cystotome was used to make a tear in the anterior capsular flap, which was continued around with Utrata forceps for continuous curvilinear capsulorrhexis. BSS on a Hilliard cannula was then used for hydrodissection and hydrodelineation of lens. The lens was noted to spin freely in the bag. Phacoemulsification handpiece was then used to remove the lens in a divide-and-conquer manner. Irrigation aspiration  handpiece removed  the remaining cortical material. Provisc was placed in the eye followed by the lens as mentioned above without any complications. Once the lens was in proper Position, The Malyugin was disengaged from the iris and removed from the eye.  The irrigation aspiration handpiece was used to remove the remaining Provisc. Miochol was placed in the anterior chamber to promote miosis. The wounds were then hydrated with BSS and noted to be watertight. The eye had a good physiological pressure and the lid speculum was removed under the microscope without any shallowing. The eye was then covered with a collagen shield soaked in Pred Forte and Vigamox and turned over to Anesthesia in stable condition after placement of patch and shield.

## 2017-02-09 ENCOUNTER — OFFICE VISIT (OUTPATIENT)
Dept: OPHTHALMOLOGY | Facility: CLINIC | Age: 72
End: 2017-02-09
Payer: MEDICARE

## 2017-02-09 DIAGNOSIS — Z98.49 POSTOPERATIVE CARE FOR CATARACT: Primary | ICD-10-CM

## 2017-02-09 DIAGNOSIS — Z98.890 H/O LASER IRIDOTOMY: ICD-10-CM

## 2017-02-09 DIAGNOSIS — Q11.2 NANOPHTHALMOS, BILATERAL: ICD-10-CM

## 2017-02-09 DIAGNOSIS — Z48.810 POSTOPERATIVE CARE FOR CATARACT: Primary | ICD-10-CM

## 2017-02-09 DIAGNOSIS — Z96.1 PSEUDOPHAKIA OF BOTH EYES: ICD-10-CM

## 2017-02-09 DIAGNOSIS — H52.03 HYPEROPIA WITH PRESBYOPIA OF BOTH EYES: ICD-10-CM

## 2017-02-09 DIAGNOSIS — H40.033 ANATOMICAL NARROW ANGLE, BILATERAL: ICD-10-CM

## 2017-02-09 DIAGNOSIS — H52.4 HYPEROPIA WITH PRESBYOPIA OF BOTH EYES: ICD-10-CM

## 2017-02-09 DIAGNOSIS — H57.03 SMALL PUPIL: ICD-10-CM

## 2017-02-09 PROCEDURE — 99212 OFFICE O/P EST SF 10 MIN: CPT | Mod: PBBFAC | Performed by: OPHTHALMOLOGY

## 2017-02-09 PROCEDURE — 99999 PR PBB SHADOW E&M-EST. PATIENT-LVL II: CPT | Mod: PBBFAC,,, | Performed by: OPHTHALMOLOGY

## 2017-02-09 PROCEDURE — 99024 POSTOP FOLLOW-UP VISIT: CPT | Mod: ,,, | Performed by: OPHTHALMOLOGY

## 2017-02-09 RX ORDER — MOXIFLOXACIN 5 MG/ML
1 SOLUTION/ DROPS OPHTHALMIC 4 TIMES DAILY
COMMUNITY
Start: 2017-02-09 | End: 2017-02-16

## 2017-02-09 NOTE — PROGRESS NOTES
HPI     DLS: 1/27/17    Pt here for 1 day post phaco w/IOL OS- 2/08/17  Pt states no pain but some discomfort.    1. Post operative state  2. Nuclear sclerotic cataract of left eye  3. Senile nuclear sclerosis, left  4. Anatomical narrow angle, bilateral  5. Anisometropia  6. Hyperopia with presbyopia of both eyes  7. Small pupil  8. H/O laser iridotomy          Last edited by Faith Moeller on 2/9/2017 10:44 AM.         Assessment /Plan     For exam results, see Encounter Report.    Postoperative care for cataract    Anatomical narrow angle, bilateral    Hyperopia with presbyopia of both eyes    Small pupil    H/O laser iridotomy    Nanophthalmos, bilateral    Pseudophakia of both eyes        Here for post op - complex phaco/IOL OS - very shallow AC / small pupil // poor red reflex// Loraine ring // trypan blue   AL is only 20.28 - almost nan opthalmic - 2/8/2017       Status post cataract extraction and insertion of intraocular lens of right eye - 7/22/2016 // PCB00 29.5   had more pain than ususal in the first post op week 2/2 carneal abrasion -   + anterior capsule phymosis - OD - consider yag cap to superior area as it may be causing some decentration  - with dilation can see ant capsule is intact and more constricted superorly than inferiorly        H/o Anatomical narrow angles OU s/p LPI, patent ou  - angles open to PTM with central shallowing 2/2 cataract  - IOP okay on no drops, never been high  - no synechiae  - LPI OD 5/6/2004, OS 5/13/2004  - CDR 0.2 OU in 2006  - HVF 2005 od full, os ?INS (unreliable)      NSC OS-  -patient c/o glare, BAT 20/80 od // 20/70 os     IOL calc -   ? nanopthalimic eye   AL  - 20.71 od and 20.28 os     Phaco / IOL OS Date: 2/8/2017 - 2nd eye // PCB00 31.5  POD # 1 - phaco/IOL   Doing well  Begin Pred Acetate QID   Begin vigamox QID  Shield at night  Glasses day  No lifting, no bending, no eye rubbing  F/U 1 week, AR/MR ou    OS - even shorter than od ((od has a 29.5 PCB00) ) // may  need to special order the lens   IOL calculations OS   PCB00 31.5  AC IOL 25.0    Ok to take her ususal anxiety meds     Pt had increase BP pre-op in holding area when first eye done  - was ok in surgery and post op     OCT macula nl ou - 8/5/2016     I have seen and personally examined the patient.  I agree with the findings, assessment and plan of the resident and/or fellow.     Bela Moody MD

## 2017-02-16 ENCOUNTER — OFFICE VISIT (OUTPATIENT)
Dept: OPHTHALMOLOGY | Facility: CLINIC | Age: 72
End: 2017-02-16
Payer: MEDICARE

## 2017-02-16 DIAGNOSIS — H40.033 ANATOMICAL NARROW ANGLE, BILATERAL: Primary | ICD-10-CM

## 2017-02-16 DIAGNOSIS — Q11.2 NANOPHTHALMOS, BILATERAL: ICD-10-CM

## 2017-02-16 DIAGNOSIS — Z98.49 POSTOPERATIVE CARE FOR CATARACT: ICD-10-CM

## 2017-02-16 DIAGNOSIS — Z48.810 POSTOPERATIVE CARE FOR CATARACT: ICD-10-CM

## 2017-02-16 DIAGNOSIS — Z98.890 H/O LASER IRIDOTOMY: ICD-10-CM

## 2017-02-16 DIAGNOSIS — Z96.1 PSEUDOPHAKIA OF BOTH EYES: ICD-10-CM

## 2017-02-16 DIAGNOSIS — H52.03 HYPEROPIA WITH PRESBYOPIA OF BOTH EYES: ICD-10-CM

## 2017-02-16 DIAGNOSIS — H57.03 SMALL PUPIL: ICD-10-CM

## 2017-02-16 DIAGNOSIS — H52.4 HYPEROPIA WITH PRESBYOPIA OF BOTH EYES: ICD-10-CM

## 2017-02-16 PROCEDURE — 99999 PR PBB SHADOW E&M-EST. PATIENT-LVL II: CPT | Mod: PBBFAC,,, | Performed by: OPHTHALMOLOGY

## 2017-02-16 PROCEDURE — 99212 OFFICE O/P EST SF 10 MIN: CPT | Mod: PBBFAC | Performed by: OPHTHALMOLOGY

## 2017-02-16 PROCEDURE — 99024 POSTOP FOLLOW-UP VISIT: CPT | Mod: ,,, | Performed by: OPHTHALMOLOGY

## 2017-02-16 RX ORDER — PREDNISOLONE ACETATE 10 MG/ML
1 SUSPENSION/ DROPS OPHTHALMIC 4 TIMES DAILY
Qty: 1 BOTTLE | Refills: 2 | Status: SHIPPED | OUTPATIENT
Start: 2017-02-16 | End: 2019-06-28

## 2017-02-16 RX ORDER — PREDNISOLONE ACETATE 10 MG/ML
1 SUSPENSION/ DROPS OPHTHALMIC 4 TIMES DAILY
Qty: 1 BOTTLE | Refills: 2 | Status: SHIPPED | OUTPATIENT
Start: 2017-02-16 | End: 2017-02-16 | Stop reason: SDUPTHER

## 2017-02-16 NOTE — PROGRESS NOTES
HPI     DLS: 2/09/17    Pt here for post phaco w/IOL OS- 2/08/17  Pt states OS has been burning, sensivitve to light, and blood shot, FB   sensitive, headache around the OS .    Meds: PA qid os              Vigamox qid os    1. Post operative state       Last edited by Bela Moody MD on 2/16/2017 11:20 AM.         Assessment /Plan     For exam results, see Encounter Report.    Anatomical narrow angle, bilateral    Postoperative care for cataract  -     Discontinue: tobramycin-dexamethasone 0.3-0.1% (TOBRADEX) 0.3-0.1 % Oint; Place into the left eye every evening. Apply to left eye and eye lids at bedtime  Dispense: 3.5 g; Refill: 2  -     Discontinue: prednisoLONE acetate (PRED FORTE) 1 % DrpS; Place 1 drop into the left eye 4 (four) times daily.  Dispense: 1 Bottle; Refill: 2  -     tobramycin-dexamethasone 0.3-0.1% (TOBRADEX) 0.3-0.1 % Oint; Place into the left eye every evening. Apply to left eye and eye lids at bedtime  Dispense: 3.5 g; Refill: 2  -     prednisoLONE acetate (PRED FORTE) 1 % DrpS; Place 1 drop into the left eye 4 (four) times daily.  Dispense: 1 Bottle; Refill: 2    Hyperopia with presbyopia of both eyes    Small pupil    H/O laser iridotomy    Nanophthalmos, bilateral    Pseudophakia of both eyes          Here for post op - complex phaco/IOL OS - very shallow AC / small pupil // poor red reflex// Loraine ring // trypan blue   AL is only 20.28 - almost nan opthalmic - 2/8/2017       Status post cataract extraction and insertion of intraocular lens of right eye - 7/22/2016 // PCB00 29.5   had more pain than ususal in the first post op week 2/2 carneal abrasion -   + anterior capsule phymosis - OD - consider yag cap to superior area as it may be causing some decentration  - with dilation can see ant capsule is intact and more constricted superorly than inferiorly        H/o Anatomical narrow angles OU s/p LPI, patent ou  - angles open to PTM with central shallowing 2/2 cataract  - IOP okay on  no drops, never been high  - no synechiae  - LPI OD 5/6/2004, OS 5/13/2004  - CDR 0.2 OU in 2006  - HVF 2005 od full, os ?INS (unreliable)      NSC OS-  -patient c/o glare, BAT 20/80 od // 20/70 os     IOL calc -   ? nanopthalimic eye   AL  - 20.71 od and 20.28 os     Phaco / IOL OS Date: 2/8/2017 - 2nd eye // PCB00 31.5  POW # 1 - phaco/IOL   Doing well- C/O burning with drops and FB sensation   Begin Pred Acetate QID - continue   Begin vigamox QID - stop when runs out]  Add tobradex ointment os q hs - has chemosis and FB sensation   Add AT's 2-8 x day prn    Shield at night  Glasses day  No lifting, no bending, no eye rubbing  F/U 3 week, AR/MR ou    OS - even shorter than od ((od has a 29.5 PCB00) ) // may need to special order the lens   IOL calculations OS   PCB00 31.5  AC IOL 25.0    Ok to take her ususal anxiety meds     Pt had increase BP pre-op in holding area when first eye done  - was ok in surgery and post op     OCT macula nl ou - 8/5/2016     I have seen and personally examined the patient.  I agree with the findings, assessment and plan of the resident and/or fellow.     Bela Moody MD

## 2017-12-01 ENCOUNTER — OFFICE VISIT (OUTPATIENT)
Dept: OPTOMETRY | Facility: CLINIC | Age: 72
End: 2017-12-01
Payer: MEDICARE

## 2017-12-01 DIAGNOSIS — H52.223 REGULAR ASTIGMATISM OF BOTH EYES: ICD-10-CM

## 2017-12-01 DIAGNOSIS — Z13.5 SCREENING FOR EYE CONDITION: ICD-10-CM

## 2017-12-01 DIAGNOSIS — Z96.1 PSEUDOPHAKIA OF BOTH EYES: ICD-10-CM

## 2017-12-01 DIAGNOSIS — Z98.42 S/P CATARACT SURGERY, LEFT: ICD-10-CM

## 2017-12-01 DIAGNOSIS — Z98.41 S/P CATARACT SURGERY, RIGHT: ICD-10-CM

## 2017-12-01 DIAGNOSIS — H43.393 VITREOUS FLOATERS OF BOTH EYES: Primary | ICD-10-CM

## 2017-12-01 PROCEDURE — 99999 PR PBB SHADOW E&M-EST. PATIENT-LVL II: CPT | Mod: PBBFAC,,, | Performed by: OPTOMETRIST

## 2017-12-01 PROCEDURE — 92014 COMPRE OPH EXAM EST PT 1/>: CPT | Mod: S$PBB,,, | Performed by: OPTOMETRIST

## 2017-12-01 PROCEDURE — 92015 DETERMINE REFRACTIVE STATE: CPT | Mod: ,,, | Performed by: OPTOMETRIST

## 2017-12-01 PROCEDURE — 99212 OFFICE O/P EST SF 10 MIN: CPT | Mod: PBBFAC | Performed by: OPTOMETRIST

## 2017-12-01 NOTE — PATIENT INSTRUCTIONS
S/P cataract surgery in both eyes, with posterior chamber intraocular lens in both eyes.  History of bilateral narrow anterior chamber angle, now resolved following cataract surgery in both eyes.    Good ocular health in both eyes.    Residual slight astigmatic refractive error in each eye, with very satisfactory best-corrected VA in each eye.    New spectacle lens Rx issued for use as desired, but okay to use OTC reading glasses for reading and close work as needed, if happy with near vision with those glasses and if happy with unaided distance VA    Send copy of exam notes to Dr. Moody for her review.    Recheck in 12 - 18 months, or prior if any problems noted in the interim.

## 2017-12-01 NOTE — PROGRESS NOTES
"HPI     Concerns About Ocular Health    Additional comments: Eye exam - general eye exam and refraction.  S/P bilateral cataract surgery.  No longer wears CLs.  Wears OTC reading glasses only.  Perceives no need for distance correction, and happy with near VA with OTC   reading glasses.            Comments   Patient's age: 72 y.o.  WF  Occupation:Consultant   Approximate date of last eye examination: 02/16/17- Dr. Moody   City/State: [facility]  Wears glasses? No  Wears CLs?: No, Patient no longer wear contact lens, she's had cataract   surgery OU     Headaches?NO  Eye pain/discomfort? No   Flashes? No  Floaters? No  Diplopia/Double vision? No  Patient's Ocular History:  Any eye surgeries? Laser PI OU, Cataract surgery OU   Any eye injury? No  Any treatment for eye disease? Narrow anterior angle OU  Family history of eye disease?                             Paternal Aunt + Blindness   Significant patient medical history:  1. Diabetes? No  2. HBP? No  3. Other (describe): None, healthy  ! OTC eyedrops currently using: None  ! Prescription eye meds currently using: None  ! Any history of allergy/adverse reaction to any eye meds used previously?   No   ! Any history of allergy/adverse reaction to eyedrops used during prior   eye exam(s)? No   ! Any history of allergy/adverse reaction to Novacaine or similar meds?   PATIENT REPORTS ALLERGY/ADVERSE REACTION TO NOVACAINE OR SIMILAR   MEDICATION.   Reaction described as was overmedicated.  ! Any history of allergy/adverse reaction to Epinephrine or similar meds?   No   ! Patient okay with use of anesthetic eyedrops to check eye pressure? Yes   ! Patient okay with use of eyedrops to dilate pupils today? Yes  ! Allergies/Medications/Medical History/Family History reviewed today? Yes      PD = 62/58  Desired reading distance = 16.5"                             Last edited by Ottoniel Gannon, OD on 12/1/2017  2:30 PM. (History)            Assessment /Plan     For exam " results, see Encounter Report.    1. Vitreous floaters of both eyes     2. S/P cataract surgery, right     3. S/P cataract surgery, left     4. Pseudophakia of both eyes     5. Regular astigmatism of both eyes     6. Screening for eye condition                    S/P cataract surgery in both eyes, with posterior chamber intraocular lens in both eyes.  History of bilateral narrow anterior chamber angle, now resolved following cataract surgery in both eyes.    Good ocular health in both eyes.    Residual slight astigmatic refractive error in each eye, with very satisfactory best-corrected VA in each eye.    New spectacle lens Rx issued for use as desired, but okay to use OTC reading glasses for reading and close work as needed, if happy with near vision with those glasses and if happy with unaided distance VA    Send copy of exam notes to Dr. Moody for her review.    Recheck in 12 - 18 months, or prior if any problems noted in the interim.

## 2019-04-10 ENCOUNTER — OFFICE VISIT (OUTPATIENT)
Dept: URGENT CARE | Facility: CLINIC | Age: 74
End: 2019-04-10
Payer: MEDICARE

## 2019-04-10 ENCOUNTER — APPOINTMENT (OUTPATIENT)
Dept: RADIOLOGY | Facility: CLINIC | Age: 74
End: 2019-04-10
Payer: MEDICARE

## 2019-04-10 VITALS
DIASTOLIC BLOOD PRESSURE: 70 MMHG | TEMPERATURE: 98.8 F | SYSTOLIC BLOOD PRESSURE: 114 MMHG | OXYGEN SATURATION: 99 % | HEART RATE: 92 BPM | RESPIRATION RATE: 20 BRPM

## 2019-04-10 DIAGNOSIS — M25.551 RIGHT HIP PAIN: ICD-10-CM

## 2019-04-10 DIAGNOSIS — M25.551 RIGHT HIP PAIN: Primary | ICD-10-CM

## 2019-04-10 PROCEDURE — 99204 OFFICE O/P NEW MOD 45 MIN: CPT | Performed by: PHYSICIAN ASSISTANT

## 2019-04-10 PROCEDURE — 73502 X-RAY EXAM HIP UNI 2-3 VIEWS: CPT

## 2019-04-10 PROCEDURE — G0463 HOSPITAL OUTPT CLINIC VISIT: HCPCS | Performed by: PHYSICIAN ASSISTANT

## 2019-04-10 PROCEDURE — 96372 THER/PROPH/DIAG INJ SC/IM: CPT | Performed by: PHYSICIAN ASSISTANT

## 2019-04-10 RX ORDER — CYCLOBENZAPRINE HCL 10 MG
10 TABLET ORAL 3 TIMES DAILY PRN
Qty: 30 TABLET | Refills: 0 | Status: SHIPPED | OUTPATIENT
Start: 2019-04-10

## 2019-04-10 RX ORDER — PREDNISONE 10 MG/1
TABLET ORAL
Qty: 26 TABLET | Refills: 0 | Status: SHIPPED | OUTPATIENT
Start: 2019-04-10

## 2019-04-10 RX ORDER — KETOROLAC TROMETHAMINE 30 MG/ML
30 INJECTION, SOLUTION INTRAMUSCULAR; INTRAVENOUS ONCE
Status: COMPLETED | OUTPATIENT
Start: 2019-04-10 | End: 2019-04-10

## 2019-04-10 RX ADMIN — KETOROLAC TROMETHAMINE 30 MG: 30 INJECTION, SOLUTION INTRAMUSCULAR; INTRAVENOUS at 12:30

## 2019-06-28 ENCOUNTER — OFFICE VISIT (OUTPATIENT)
Dept: OPTOMETRY | Facility: CLINIC | Age: 74
End: 2019-06-28
Payer: MEDICARE

## 2019-06-28 DIAGNOSIS — Z96.1 PSEUDOPHAKIA OF BOTH EYES: ICD-10-CM

## 2019-06-28 DIAGNOSIS — Z98.42 S/P CATARACT SURGERY, LEFT: ICD-10-CM

## 2019-06-28 DIAGNOSIS — H43.393 VITREOUS FLOATERS OF BOTH EYES: Primary | ICD-10-CM

## 2019-06-28 DIAGNOSIS — Z13.5 SCREENING FOR EYE CONDITION: ICD-10-CM

## 2019-06-28 DIAGNOSIS — H52.223 REGULAR ASTIGMATISM OF BOTH EYES: ICD-10-CM

## 2019-06-28 DIAGNOSIS — Z98.41 S/P CATARACT SURGERY, RIGHT: ICD-10-CM

## 2019-06-28 PROCEDURE — 99999 PR PBB SHADOW E&M-EST. PATIENT-LVL III: ICD-10-PCS | Mod: PBBFAC,,, | Performed by: OPTOMETRIST

## 2019-06-28 PROCEDURE — 92014 COMPRE OPH EXAM EST PT 1/>: CPT | Mod: S$PBB,,, | Performed by: OPTOMETRIST

## 2019-06-28 PROCEDURE — 92015 PR REFRACTION: ICD-10-PCS | Mod: ,,, | Performed by: OPTOMETRIST

## 2019-06-28 PROCEDURE — 99999 PR PBB SHADOW E&M-EST. PATIENT-LVL III: CPT | Mod: PBBFAC,,, | Performed by: OPTOMETRIST

## 2019-06-28 PROCEDURE — 92014 PR EYE EXAM, EST PATIENT,COMPREHESV: ICD-10-PCS | Mod: S$PBB,,, | Performed by: OPTOMETRIST

## 2019-06-28 PROCEDURE — 99213 OFFICE O/P EST LOW 20 MIN: CPT | Mod: PBBFAC | Performed by: OPTOMETRIST

## 2019-06-28 PROCEDURE — 92015 DETERMINE REFRACTIVE STATE: CPT | Mod: ,,, | Performed by: OPTOMETRIST

## 2019-06-28 NOTE — PROGRESS NOTES
"HPI     Concerns About Ocular Health      Additional comments: Annual general eye exam and refraction.  No acute ocular/vision problems.               Comments     Patient's age: 74 y.o.  WF  Occupation:Consultant   Approximate date of last eye examination: 12/01/2017  City/State: [facility]  Wears glasses? OTC +1.25 for reading as needed   Wears CLs?: No  Headaches?NO  Eye pain/discomfort? No   Flashes? No  Floaters? No  Diplopia/Double vision? No  Patient's Ocular History:  Any eye surgeries? Laser PI OU, Cataract surgery OU   Any eye injury? No  Any treatment for eye disease? Narrow anterior angle OU - s/p laser PI   Family history of eye disease?                             Paternal Aunt + Blindness   Significant patient medical history:  1. Diabetes? No  2. HBP? No  3. Other (describe): None, healthy  ! OTC eyedrops currently using: None  ! Prescription eye meds currently using: None  ! Any history of allergy/adverse reaction to any eye meds used previously?   No   ! Any history of allergy/adverse reaction to eyedrops used during prior   eye exam(s)? No   ! Any history of allergy/adverse reaction to Novacaine or similar meds?   PATIENT REPORTS ALLERGY/ADVERSE REACTION TO NOVACAINE OR SIMILAR   MEDICATION.   Reaction described as was overmedicated.  ! Any history of allergy/adverse reaction to Epinephrine or similar meds?   No   ! Patient okay with use of anesthetic eyedrops to check eye pressure? Yes   ! Patient okay with use of eyedrops to dilate pupils today? Yes  ! Allergies/Medications/Medical History/Family History reviewed today? Yes      PD = 62/58  Desired reading distance = 16.5"                                 Last edited by Ottoniel Gannon, OD on 6/28/2019  3:19 PM. (History)        ROS     Negative for: Eyes    Last edited by Ottoniel Gannon, OD on 6/28/2019  4:12 PM. (History)        Assessment /Plan     For exam results, see Encounter Report.    1. Vitreous floaters of both eyes     2. S/P " cataract surgery, right     3. S/P cataract surgery, left     4. Pseudophakia of both eyes     5. Regular astigmatism of both eyes     6. Screening for eye condition                      S/P cataract surgery in both eyes, with posterior chamber intraocular lens in both eyes.  History of bilateral narrow anterior chamber angle, now resolved following cataract surgery in both eyes.     Good ocular health in both eyes.     Residual slight astigmatic refractive error in each eye, with very satisfactory best-corrected VA in each eye.     New spectacle lens Rx issued for use as desired, but okay to use OTC reading glasses for reading and close work as needed, if happy with near vision with those glasses and if happy with unaided distance VA       Recheck in 12 - 18 months, or prior if any problems noted in the interim.

## 2019-06-28 NOTE — PATIENT INSTRUCTIONS
S/P cataract surgery in both eyes, with posterior chamber intraocular lens in both eyes.  History of bilateral narrow anterior chamber angle, now resolved following cataract surgery in both eyes.     Good ocular health in both eyes.     Residual slight astigmatic refractive error in each eye, with very satisfactory best-corrected VA in each eye.     New spectacle lens Rx issued for use as desired, but okay to use OTC reading glasses for reading and close work as needed, if happy with near vision with those glasses and if happy with unaided distance VA       Recheck in 12 - 18 months, or prior if any problems noted in the interim.

## 2021-01-22 ENCOUNTER — TELEPHONE (OUTPATIENT)
Dept: OPTOMETRY | Facility: CLINIC | Age: 76
End: 2021-01-22

## 2021-02-03 ENCOUNTER — OFFICE VISIT (OUTPATIENT)
Dept: OPTOMETRY | Facility: CLINIC | Age: 76
End: 2021-02-03
Payer: MEDICARE

## 2021-02-03 DIAGNOSIS — Z96.1 PSEUDOPHAKIA OF BOTH EYES: ICD-10-CM

## 2021-02-03 DIAGNOSIS — H43.393 VITREOUS FLOATERS OF BOTH EYES: Primary | ICD-10-CM

## 2021-02-03 DIAGNOSIS — H52.223 REGULAR ASTIGMATISM OF BOTH EYES: ICD-10-CM

## 2021-02-03 DIAGNOSIS — Z98.41 S/P CATARACT SURGERY, RIGHT: ICD-10-CM

## 2021-02-03 DIAGNOSIS — Z98.42 S/P CATARACT SURGERY, LEFT: ICD-10-CM

## 2021-02-03 DIAGNOSIS — Z13.5 SCREENING FOR EYE CONDITION: ICD-10-CM

## 2021-02-03 PROCEDURE — 99999 PR PBB SHADOW E&M-EST. PATIENT-LVL III: ICD-10-PCS | Mod: PBBFAC,,, | Performed by: OPTOMETRIST

## 2021-02-03 PROCEDURE — 92014 PR EYE EXAM, EST PATIENT,COMPREHESV: ICD-10-PCS | Mod: S$PBB,,, | Performed by: OPTOMETRIST

## 2021-02-03 PROCEDURE — 92015 DETERMINE REFRACTIVE STATE: CPT | Mod: ,,, | Performed by: OPTOMETRIST

## 2021-02-03 PROCEDURE — 92015 PR REFRACTION: ICD-10-PCS | Mod: ,,, | Performed by: OPTOMETRIST

## 2021-02-03 PROCEDURE — 92014 COMPRE OPH EXAM EST PT 1/>: CPT | Mod: S$PBB,,, | Performed by: OPTOMETRIST

## 2021-02-03 PROCEDURE — 99213 OFFICE O/P EST LOW 20 MIN: CPT | Mod: PBBFAC | Performed by: OPTOMETRIST

## 2021-02-03 PROCEDURE — 99999 PR PBB SHADOW E&M-EST. PATIENT-LVL III: CPT | Mod: PBBFAC,,, | Performed by: OPTOMETRIST

## 2022-02-08 ENCOUNTER — TELEPHONE (OUTPATIENT)
Dept: OPHTHALMOLOGY | Facility: CLINIC | Age: 77
End: 2022-02-08
Payer: MEDICARE

## 2022-02-08 NOTE — TELEPHONE ENCOUNTER
Called patient lvm rescheduled patient   appt    ----- Message from Shelton Joyce MA sent at 2/4/2022  1:45 PM CST -----  Contact: Manasa @[phone]  Spoke with patient would like her  Clint  appt be change to a wednesday   ----- Message -----  From: Porsha Beebe  Sent: 2/4/2022  10:26 AM CST  To: Shelton Joyce MA    Pt returning phone call. There was no message left in the chart to view.

## 2022-02-17 ENCOUNTER — OFFICE VISIT (OUTPATIENT)
Dept: OPTOMETRY | Facility: CLINIC | Age: 77
End: 2022-02-17
Payer: MEDICARE

## 2022-02-17 DIAGNOSIS — H52.202 ASTIGMATISM OF LEFT EYE, UNSPECIFIED TYPE: ICD-10-CM

## 2022-02-17 DIAGNOSIS — Z96.1 PSEUDOPHAKIA OF BOTH EYES: ICD-10-CM

## 2022-02-17 DIAGNOSIS — H43.393 VITREOUS FLOATERS OF BOTH EYES: Primary | ICD-10-CM

## 2022-02-17 DIAGNOSIS — Z98.42 S/P CATARACT SURGERY, LEFT: ICD-10-CM

## 2022-02-17 DIAGNOSIS — Z13.5 SCREENING FOR EYE CONDITION: ICD-10-CM

## 2022-02-17 DIAGNOSIS — Z98.41 S/P CATARACT SURGERY, RIGHT: ICD-10-CM

## 2022-02-17 PROCEDURE — 99999 PR PBB SHADOW E&M-EST. PATIENT-LVL II: ICD-10-PCS | Mod: PBBFAC,,, | Performed by: OPTOMETRIST

## 2022-02-17 PROCEDURE — 92015 PR REFRACTION: ICD-10-PCS | Mod: ,,, | Performed by: OPTOMETRIST

## 2022-02-17 PROCEDURE — 99999 PR PBB SHADOW E&M-EST. PATIENT-LVL II: CPT | Mod: PBBFAC,,, | Performed by: OPTOMETRIST

## 2022-02-17 PROCEDURE — 92014 COMPRE OPH EXAM EST PT 1/>: CPT | Mod: S$PBB,,, | Performed by: OPTOMETRIST

## 2022-02-17 PROCEDURE — 92015 DETERMINE REFRACTIVE STATE: CPT | Mod: ,,, | Performed by: OPTOMETRIST

## 2022-02-17 PROCEDURE — 92014 PR EYE EXAM, EST PATIENT,COMPREHESV: ICD-10-PCS | Mod: S$PBB,,, | Performed by: OPTOMETRIST

## 2022-02-17 PROCEDURE — 99212 OFFICE O/P EST SF 10 MIN: CPT | Mod: PBBFAC | Performed by: OPTOMETRIST

## 2022-02-17 NOTE — PROGRESS NOTES
"[facility]     eye examination and refraction      Additional comments: Annual general eye examination and refraction.  No acute ocular/vision problems.  No longer wears CLs.   No dry eye symptoms.  Uses OTC reading glasses.              Comments     Patient's age: 77 y.o.  WF   Occupation:Consultant   Approximate date of last eye examination: 02/03/2021  City/State: [facility]   Wears glasses?  OTC reading glasses, as needed                Happy with unaided distance VA   Wears CLs?: No   Headaches? No   Eye pain/discomfort? No   Flashes? No   Floaters? No   Diplopia/Double vision? No   Patient's Ocular History:   Any eye surgeries? Laser PI OU, Cataract surgery OU (Dr. Moody)     Any eye injury? No   Any treatment for eye disease? Narrow anterior angle OU - s/p laser PI OU   Family history of eye disease?                             Paternal Aunt + Blindness   Significant patient medical history:   1. Diabetes? No   2. HBP? No   3. Other (describe): None, healthy   ! OTC eyedrops currently using: None   ! Prescription eye meds currently using: None   ! Any history of allergy/adverse reaction to any eye meds used previously?   No   ! Any history of allergy/adverse reaction to eyedrops used during prior   eye exam(s)? No   ! Any history of allergy/adverse reaction to Novacaine or similar meds?   PATIENT REPORTS ALLERGY/ADVERSE REACTION TO NOVACAINE OR SIMILAR   MEDICATION.   Reaction described as was overmedicated.   ! Any history of allergy/adverse reaction to Epinephrine or similar meds?   No   ! Patient okay with use of anesthetic eyedrops to check eye pressure? Yes   ! Patient okay with use of eyedrops to dilate pupils today? Yes   ! Allergies/Medications/Medical History/Family History reviewed today? Yes         PD = 62/58   Desired reading distance =   16.5"                                 Copy to Current    2/16/2017    DLS: 2/09/17     Pt here for post phaco w/IOL OS- 2/08/17   Pt states OS has been burning, " sensivitve to light, and blood shot, FB   sensitive, headache around the OS .     Meds: PA qid os              Vigamox qid os     1. Post operative state  Copy to Current    2/9/2017    DLS: 1/27/17     Pt here for 1 day post phaco w/IOL OS- 2/08/17   Pt states no pain but some discomfort.     1. Post operative state   2. Nuclear sclerotic cataract of left eye   3. Senile nuclear sclerosis, left   4. Anatomical narrow angle, bilateral   5. Anisometropia   6. Hyperopia with presbyopia of both eyes   7. Small pupil   8. H/O laser iridotomy   Copy to Current    1/27/2017    DLS: 8/05/16     Pt here for Pre-Op phaco w/IOL OS- (Surgery is 2/08/17)     Meds: No GTTS     1. Anatomical narrow angle, bilateral   2. Senile nuclear sclerosis, left   3. Hyperopia with presbyopia, both eyes   4. Anisometropia   5. H/O laser iridotomy   Copy to Current    8/5/2016    S/p Phaco IOL OD 6/22/16     MEDS:   PA QDAY OD       Ibuprofen 600mg Q6H PO   Copy to Current    7/15/2016    DLS: 6/30/16     Pt here for S/P phaco w/IOL OD- 6/22/16   Pt states OD is getting better. Pt states OD is very dry.     Meds: Refresh qid od             Maxitrol oint qhs od             PA qid od     1. Post operative state   2. Anatomical narrow angle, bilateral   3. Senile nuclear sclerosis, left   4. H/O laser iridotomy   5. Hyperopia with presbyopia, both eyes   6. Anisometropia   Copy to Current    6/30/2016    S/p Phaco IOL OD 6/22/16     MEDS:   PA Q2H OD   Maxitrol latosha QHS OD   AT's QID OD   Ibuprofen 600mg Q6H PO   Copy to Current    6/28/2016    DLS: 6/27/16     Pt here for S/P phaco w/IOL OD- 6/22/16   Pt states OD started to become painful again in OD and OTC pain meds are   not helping. Pt also c/o light sensitive she doesn't take off her   sunglasses off in the house due to this and feels if air would hit OD it   would hurt. Pt also states under OD is very painful as well. Pt states on   a pain scale from 0-10 her pain right now is a 7.      Meds: Dexamethasone qid od              Vigamox qid od     1. Post operative state   2. Anatomical narrow angle, bilateral   3. Senile nuclear sclerosis, left   4. H/O laser iridotomy   5. Hyperopia with presbyopia of both eyes   6. Anisometropia   Copy to Current    6/27/2016    S/p Phaco IOL OD 6/22/16     MEDS:   Dexamethasone QID OD   Vigamox QID OD   Tylenol Q4H PO   Copy to Current    6/24/2016    2 day po  Phaco IOL OD 6/22/16     MEDS:   Dexamethasone QID OD   Vigamox QID OD     Copy to Current    6/23/2016    1 day po  Phaco IOL OD 6/22/16     Copy to Current    6/17/2016    Preop Phaco IOL OD     1. Narrow Angles   2. NS OU   3. Hyperope/Presbyope   4. Anisometropia  Copy to Current    3/21/2016    Pt here for narrow angle evaluation per . Pt was last seen by   Dr. Moody in 12/06.     1. Narrow Angles   2. NS OU   3. Hyperope/Presbyope   4. Anisometropia  Copy to Current    3/18/2016    Patient in today for contact lens follow-up with general eye examination.    Refer to additional patient encounter notes dated 03/18/2016.     Copy to Current    3/18/2016    Patient's age: 71 y.o.  WF   Occupation:Consultant   Approximate date of last eye examination: 07/08/2015   Name of last eye doctor seen: Dr Gannon   City/State: [place]   Wears glasses? No   Wears CLs?: Yes   If yes:   Type of CL worn: Acuvue Oasys SCLs   OD 8.4   14.00 sph.+1.00   OS 8.4   14.00 sph +6.00               Wears full-time or part-time: Full time   Sleeps with contact lenses: No   CL Solution used:  Emperatriz   How often replace CLs: Every 1 week   Any problem with VA with CLs? No   Headaches?NO   Eye pain/discomfort? No   Flashes? No   Floaters? No   Diplopia/Double vision? No   Patient's Ocular History:   Any eye surgeries? Laser PI OU   Any eye injury? No   Any treatment for eye disease? Narrow angle glaucoma   Family history of eye disease?   Paternal Aunt + Blindness   Significant patient medical history:   1.  "Diabetes? No   2. HBP? No   3. Other (describe): None, healthy   ! OTC eyedrops currently using: None   ! Prescription eye meds currently using: None   ! Any history of allergy/adverse reaction to any eye meds used previously?   No   ! Any history of allergy/adverse reaction to eyedrops used during prior   eye exam(s)? No   ! Any history of allergy/adverse reaction to Novacaine or similar meds?   PATIENT REPORTS ALLERGY/ADVERSE REACTION TO NOVACAINE OR SIMILAR   MEDICATION.   Reaction described as was overmedicated.   ! Any history of allergy/adverse reaction to Epinephrine or similar meds?   No   ! Patient okay with use of anesthetic eyedrops to check eye pressure? Yes   ! Patient okay with use of eyedrops to dilate pupils today? Yes   ! Allergies/Medications/Medical History/Family History reviewed today? Yes         PD = 62/58   Desired reading distance = 16.5"                       Copy to Current    7/8/2015    Patient in today for contact lens follow-up with general eye examination.    Refer to additional patient encounter notes dated 07/08/2015.     Copy to Current    7/8/2015    Patient's age: 70 y.o.   Occupation:   Approximate date of last eye examination:  05/09/2014   Name of last eye doctor seen: Dr Gannon   City/State: [facility]   Wears glasses? No   Wears CLs?:  Yes            If yes:               Type of CL worn:     Right Acuvue Advance Plus 8.70 14.0 +3.50 Sphere   Left Acuvue Advance Plus 8.70 14.0 +6.00 Sphere                         Wears full-time or part-time:  Full time                Sleeps with contact lenses:  No                CL Solution used:  Opti-free                How often replace CLs:  Every 1-2 weeks               Any problem with VA with CLs?  Pt st she prefers her vision   when only wearing OS lens   St she has trouble driving when wearing both lenses together               Has patient read and signed the contact lens fee information   document? Yes   Headaches?  Pt co headaches " "following fall approximately 3 weeks ago   Pt st she was not seen by a physician or had any testing done   Eye pain/discomfort?  No                                                                                     Flashes?  No   Floaters?  No   Diplopia/Double vision?  No   Patient's Ocular History:          Any eye surgeries? Laser PI OU          Any eye injury?  No          Any treatment for eye disease? Narrow angle glaucoma   Family history of eye disease?   Paternal Aunt + Blindness   Paternal Cousin + Macular Degeneration   Significant patient medical history:         1. Diabetes?  No   2. HBP?  No               3. Other (describe):  None, healthy    ! OTC eyedrops currently using:  None    ! Prescription eye meds currently using:  None    ! Any history of allergy/adverse reaction to any eye meds used   previously?  No    ! Any history of allergy/adverse reaction to eyedrops used during prior   eye exam(s)? No    ! Any history of allergy/adverse reaction to Novacaine or similar meds?   PATIENT REPORTS ALLERGY/ADVERSE REACTION TO NOVACAINE OR SIMILAR   MEDICATION.   Reaction described as was overmedicated.    ! Any history of allergy/adverse reaction to Epinephrine or similar meds?   No    ! Patient okay with use of anesthetic eyedrops to check eye pressure?    Yes        ! Patient okay with use of eyedrops to dilate pupils today?  Yes    !  Allergies/Medications/Medical History/Family History reviewed today?    Yes       PD =   62/58   Desired reading distance =  16.5"                                                                 Copy to Current    5/9/2014    Patient in today for contact lens follow-up with general eye examination.    Refer to additional patient encounter notes dated 05/09/2014.     Copy to Current    5/9/2014    Approximate date of last eye examination:  11/2012   Name of last eye doctor seen:  Dr. Gannon   Wears glasses? no   Wears CLs?:  yes            If yes:               Type of " "CL worn:  SCLs               Wears full-time or part-time:  Full time                Sleeps with contact lenses:  no                CL Solution used:  optifree                How often replace CLs:  Every 1-2 wks               Any problem with VA with CLs?  no               Has patient read and signed the contact lens fee information   document? yes   Headaches? no   Eye pain/discomfort?  no                                                                                       Flashes?  no   Floaters?  no   Diplopia/Double vision?  no   Patient's Ocular History:          Any eye surgeries?  S/p PI OU          Any eye injury?  no          Any treatment for eye disease?  glaucoma   Family history of eye disease?  none   Significant patient medical history:         1. Diabetes?  no      If yes, IDDM or NIDDM?  n/a   2. HBP?  no               3. Other (describe):  healthy    ! OTC eyedrops currently using:  no    ! Prescription eye meds currently using:  no    ! Any history of allergy/adverse reaction to any eye meds used   previously?  no    ! Any history of allergy/adverse reaction to eyedrops used during prior   eye exam(s)? no    ! Any history of allergy/adverse reaction to Novacaine or similar meds?   PATIENT REPORTS ALLERGY/ADVERSE REACTION TO NOVACAINE OR SIMILAR   MEDICATION.  Reaction described as was overmedicated.      ! Any history of allergy/adverse reaction to Epinephrine or similar meds?   no    ! Patient okay with use of anesthetic eyedrops to check eye pressure? yes             ! Patient okay with use of eyedrops to dilate pupils today? yes     !  Allergies/Medications/Medical History/Family History reviewed today?    yes     PD =   62/58   Desired reading distance =  16.5"                                                                                                                  Last edited by Ottoniel Gannon, OD on 2/17/2022  1:38 PM. (History)        ROS     Negative for: Allergic/Imm    Last " edited by Ottoniel Gannon, OD on 2/17/2022  1:57 PM. (History)        Assessment /Plan     For exam results, see Encounter Report.    1. Vitreous floaters of both eyes     2. S/P cataract surgery, right     3. S/P cataract surgery, left     4. Pseudophakia of both eyes     5. Screening for eye condition     6. Astigmatism of left eye, unspecified type                  S/P cataract surgery in both eyes, with posterior chamber intraocular lens in both eyes.  History of bilateral narrow anterior chamber angle, now resolved following cataract surgery in both eyes.     Good ocular health in both eyes.     Resultant emmetropia at distance in the right eye and residual slight astigmatic refractive error in the left eye, with very satisfactory best-corrected VA in each eye (better in the right eye than in the left eye).     New spectacle lens Rx entered into record, but not issued, as Mrs. Goodrich feels no need for spectacle lenses for distance viewing.   Okay to use OTC reading glasses for reading and close work as needed, if happy with near vision with those glasses and if happy with unaided distance VA        Recheck in 12 - 18 months, or prior if any problems noted in the interim.

## 2022-02-17 NOTE — PATIENT INSTRUCTIONS
S/P cataract surgery in both eyes, with posterior chamber intraocular lens in both eyes.  History of bilateral narrow anterior chamber angle, now resolved following cataract surgery in both eyes.     Good ocular health in both eyes.     Resultant emmetropia at distance in the right eye and residual slight astigmatic refractive error in the left eye, with very satisfactory best-corrected VA in each eye (better in the right eye than in the left eye).     New spectacle lens Rx entered into record, but not issued, as Mrs. Goodrich feels no need for spectacle lenses for distance viewing.   Okay to use OTC reading glasses for reading and close work as needed, if happy with near vision with those glasses and if happy with unaided distance VA        Recheck in 12 - 18 months, or prior if any problems noted in the interim.

## 2022-04-19 ENCOUNTER — HOSPITAL ENCOUNTER (EMERGENCY)
Facility: HOSPITAL | Age: 77
Discharge: HOME | End: 2022-04-19
Attending: EMERGENCY MEDICINE
Payer: MEDICARE

## 2022-04-19 VITALS
WEIGHT: 119 LBS | BODY MASS INDEX: 22.47 KG/M2 | HEART RATE: 74 BPM | SYSTOLIC BLOOD PRESSURE: 181 MMHG | OXYGEN SATURATION: 97 % | RESPIRATION RATE: 18 BRPM | HEIGHT: 61 IN | DIASTOLIC BLOOD PRESSURE: 79 MMHG | TEMPERATURE: 98 F

## 2022-04-19 DIAGNOSIS — S00.03XA CONTUSION OF SCALP, INITIAL ENCOUNTER: ICD-10-CM

## 2022-04-19 DIAGNOSIS — S09.90XA CLOSED HEAD INJURY, INITIAL ENCOUNTER: Primary | ICD-10-CM

## 2022-04-19 DIAGNOSIS — S00.412A ABRASION OF LEFT EAR, INITIAL ENCOUNTER: ICD-10-CM

## 2022-04-19 DIAGNOSIS — S06.0X0A CONCUSSION WITHOUT LOSS OF CONSCIOUSNESS, INITIAL ENCOUNTER: ICD-10-CM

## 2022-04-19 PROCEDURE — 99284 EMERGENCY DEPT VISIT MOD MDM: CPT | Mod: 25
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
## 2023-01-09 ENCOUNTER — OFFICE VISIT (OUTPATIENT)
Dept: URGENT CARE | Facility: CLINIC | Age: 78
End: 2023-01-09
Payer: MEDICARE

## 2023-01-09 VITALS
HEART RATE: 115 BPM | SYSTOLIC BLOOD PRESSURE: 128 MMHG | BODY MASS INDEX: 21.9 KG/M2 | DIASTOLIC BLOOD PRESSURE: 82 MMHG | WEIGHT: 116 LBS | HEIGHT: 61 IN | OXYGEN SATURATION: 97 % | TEMPERATURE: 102 F

## 2023-01-09 DIAGNOSIS — R05.1 ACUTE COUGH: ICD-10-CM

## 2023-01-09 DIAGNOSIS — U07.1 COVID-19 VIRUS DETECTED: ICD-10-CM

## 2023-01-09 DIAGNOSIS — R50.9 FEVER, UNSPECIFIED FEVER CAUSE: ICD-10-CM

## 2023-01-09 DIAGNOSIS — U07.1 COVID-19: Primary | ICD-10-CM

## 2023-01-09 LAB
CTP QC/QA: YES
SARS-COV-2 AG RESP QL IA.RAPID: POSITIVE

## 2023-01-09 PROCEDURE — 99203 PR OFFICE/OUTPT VISIT, NEW, LEVL III, 30-44 MIN: ICD-10-PCS | Mod: CR,S$GLB,, | Performed by: NURSE PRACTITIONER

## 2023-01-09 PROCEDURE — 87811 SARS CORONAVIRUS 2 ANTIGEN POCT, MANUAL READ: ICD-10-PCS | Mod: QW,CR,S$GLB, | Performed by: NURSE PRACTITIONER

## 2023-01-09 PROCEDURE — 87811 SARS-COV-2 COVID19 W/OPTIC: CPT | Mod: QW,CR,S$GLB, | Performed by: NURSE PRACTITIONER

## 2023-01-09 PROCEDURE — 99203 OFFICE O/P NEW LOW 30 MIN: CPT | Mod: CR,S$GLB,, | Performed by: NURSE PRACTITIONER

## 2023-01-09 RX ORDER — PROMETHAZINE HYDROCHLORIDE AND DEXTROMETHORPHAN HYDROBROMIDE 6.25; 15 MG/5ML; MG/5ML
5 SYRUP ORAL EVERY 6 HOURS PRN
Qty: 120 ML | Refills: 0 | Status: SHIPPED | OUTPATIENT
Start: 2023-01-09 | End: 2023-01-19

## 2023-01-09 NOTE — PATIENT INSTRUCTIONS
Please return here or go to the Emergency Department for any concerns or worsening of condition.  Please drink plenty of fluids.  Please get plenty of rest.  If you do not have Hypertension or any history of palpitations, it is ok to take over the counter Sudafed or Mucinex D or Allegra-D or Claritin-D or Zyrtec-D.  If you do take one of the above, it is ok to combine that with plain over the counter Mucinex or Allegra or Claritin or Zyrtec.  If for example you are taking Zyrtec -D, you can combine that with Mucinex, but not Mucinex-D.  If you are taking Mucinex-D, you can combine that with plain Allegra or Claritin or Zyrtec.   If you do have Hypertension or palpitations, it is safe to take Coricidin HBP for relief of sinus symptoms.  If not allergic, please take over the counter Tylenol (Acetaminophen) and/or Motrin (Ibuprofen) as directed for control of pain and/or fever.  Please follow up with your primary care doctor or specialist as needed.    If you  smoke, please stop smoking.    Your test was POSITIVE for COVID-19 (coronavirus).       Please isolate yourself at home.  You may leave home and/or return to work once the following conditions are met:    If you were not hospitalized and are not moderately to severely immunocompromised:   More than 5 days since symptoms first appeared AND  More than 24 hours fever free without medications AND  Symptoms are improving  Continue to wear a mask around others for 5 additional days.    If you were hospitalized OR are moderately to severely immunocompromised:  More than 20 days since symptoms first appeared  More than 24 hours fever free without medications  Symptoms have improved    If you had no symptoms but tested positive:  More than 5 days since the date of the first positive test (20 days if moderately to severely immunocompromised). If you develop symptoms, then use the guidelines above.  Continue to wear a mask around others for 5 additional days.      Contact  Tracing    As one of the next steps, you will receive a call or text from the [place] [facility] Health ([facility]) COVID-19 Tracing Team. See the contact information below so you know not to ignore the health departments call. It is important that you contact them back immediately so they can help.      Contact Tracer Number:  [phone]  Caller ID for most carriers: [place] Dept [facility]     What is contact tracing?  Contact tracing is a process that helps identify everyone who has been in close contact with an infected person. Contact tracers let those people know they may have been exposed and guide them on next steps. Confidentiality is important for everyone; no one will be told who may have exposed them to the virus.  Your involvement is important. The more we know about where and how this virus is spreading, the better chance we have at stopping it from spreading further.  What does exposure mean?  Exposure means you have been within 6 feet for more than 15 minutes with a person who has or had COVID-19.  What kind of questions do the contact tracers ask?  A contact tracer will confirm your basic contact information including name, address, phone number, and next of kin, as well as asking about any symptoms you may have had. Theyll also ask you how you think you may have gotten sick, such as places where you may have been exposed to the virus, and people you were with. Those names will never be shared with anyone outside of that call, and will only be used to help trace and stop the spread of the virus.   I have privacy concerns. How will the state use my information?  Your privacy about your health is important. All calls are completed using call centers that use the appropriate health privacy protection measures (HIPAA compliance), meaning that your patient information is safe. No one will ever ask you any questions related to immigration status. Your health comes first.   Do I have to participate?  You do  not have to participate, but we strongly encourage you to. Contact tracing can help us catch and control new outbreaks as theyre developing to keep your friends and family safe.   What if I dont hear from anyone?  If you dont receive a call within 24 hours, you can call the number above right away to inquire about your status. That line is open from 8:00 am - 8:00 p.m., 7 days a week.  Contact tracing saves lives! Together, we have the power to beat this virus and keep our loved ones and neighbors safe.    For more information see CDC link below.      https://www.cdc.gov/coronavirus/2019-ncov/hcp/guidance-prevent-spread.html#precautions        Sources:  [facility], [facility] and [facility]           Sincerely,     XIANG Tran

## 2023-01-09 NOTE — PROGRESS NOTES
"Subjective:       Patient ID: Manasa Goodrich is a 77 y.o. female.    Vitals:  height is 5' 1" (1.549 m) and weight is 52.6 kg (116 lb). Her oral temperature is 102.3 °F (39.1 °C) (abnormal). Her blood pressure is 128/82 and her pulse is 115 (abnormal). Her oxygen saturation is 97%.     Chief Complaint: Cough    This is a 77 y.o. female who presents today with a chief complaint of cough.  Patient presents with:    Patient experiencing elevated temp fatigue headache chest congestion with cough nasal congestion and overall not feeling well that has progressively worsened over the past 2 days.  Patient reports exposure to a close household contact who tested positive for COVID 5 days ago.        Cough  This is a new problem. The current episode started in the past 7 days. The problem has been gradually worsening. The cough is Non-productive. Associated symptoms include chills, ear congestion, headaches, myalgias, nasal congestion, a sore throat and shortness of breath. Nothing aggravates the symptoms. She has tried prescription cough suppressant for the symptoms.     Constitution: Positive for chills.   HENT:  Positive for congestion and sore throat.    Neck: neck negative.   Eyes: Negative.    Respiratory:  Positive for cough and shortness of breath.    Gastrointestinal: Negative.    Genitourinary: Negative.    Musculoskeletal:  Positive for muscle ache.   Skin:  Negative for color change.   Neurological:  Positive for headaches. Negative for dizziness, disorientation and altered mental status.   Psychiatric/Behavioral:  Negative for altered mental status, disorientation and confusion.          Objective:      Physical Exam   Constitutional: She is oriented to person, place, and time. She appears well-developed. She is cooperative.  Non-toxic appearance. She does not appear ill. No distress.   HENT:   Head: Normocephalic and atraumatic.   Ears:   Right Ear: Hearing, tympanic membrane, external ear and ear canal normal. "   Left Ear: Hearing, tympanic membrane, external ear and ear canal normal.   Nose: Nose normal. No mucosal edema, rhinorrhea or nasal deformity. No epistaxis. Right sinus exhibits no maxillary sinus tenderness and no frontal sinus tenderness. Left sinus exhibits no maxillary sinus tenderness and no frontal sinus tenderness.   Mouth/Throat: Uvula is midline, oropharynx is clear and moist and mucous membranes are normal. No trismus in the jaw. Normal dentition. No uvula swelling. No oropharyngeal exudate, posterior oropharyngeal edema or posterior oropharyngeal erythema.   Eyes: Conjunctivae and lids are normal. No scleral icterus.   Neck: Trachea normal and phonation normal. Neck supple. No edema present. No erythema present. No neck rigidity present.   Cardiovascular: Normal rate, regular rhythm, normal heart sounds and normal pulses.   Pulmonary/Chest: Effort normal and breath sounds normal. No respiratory distress. She has no decreased breath sounds. She has no rhonchi.   Abdominal: Normal appearance.   Musculoskeletal: Normal range of motion.         General: No deformity. Normal range of motion.   Neurological: She is alert and oriented to person, place, and time. She exhibits normal muscle tone. Coordination normal.   Skin: Skin is warm, dry, intact, not diaphoretic and not pale.   Psychiatric: Her speech is normal and behavior is normal. Judgment and thought content normal.   Nursing note and vitals reviewed.      Past medical history and current medications reviewed.     Results for orders placed or performed in visit on 01/09/23   SARS Coronavirus 2 Antigen, POCT Manual Read   Result Value Ref Range    SARS Coronavirus 2 Antigen Positive (A) Negative     Acceptable Yes         Assessment:           1. COVID-19    2. Fever, unspecified fever cause    3. Acute cough              Plan:         COVID-19    Fever, unspecified fever cause  -     SARS Coronavirus 2 Antigen, POCT Manual Read    Acute  cough             Patient Instructions   Please return here or go to the Emergency Department for any concerns or worsening of condition.  Please drink plenty of fluids.  Please get plenty of rest.  If you do not have Hypertension or any history of palpitations, it is ok to take over the counter Sudafed or Mucinex D or Allegra-D or Claritin-D or Zyrtec-D.  If you do take one of the above, it is ok to combine that with plain over the counter Mucinex or Allegra or Claritin or Zyrtec.  If for example you are taking Zyrtec -D, you can combine that with Mucinex, but not Mucinex-D.  If you are taking Mucinex-D, you can combine that with plain Allegra or Claritin or Zyrtec.   If you do have Hypertension or palpitations, it is safe to take Coricidin HBP for relief of sinus symptoms.  If not allergic, please take over the counter Tylenol (Acetaminophen) and/or Motrin (Ibuprofen) as directed for control of pain and/or fever.  Please follow up with your primary care doctor or specialist as needed.    If you  smoke, please stop smoking.    Your test was POSITIVE for COVID-19 (coronavirus).       Please isolate yourself at home.  You may leave home and/or return to work once the following conditions are met:    If you were not hospitalized and are not moderately to severely immunocompromised:   More than 5 days since symptoms first appeared AND  More than 24 hours fever free without medications AND  Symptoms are improving  Continue to wear a mask around others for 5 additional days.    If you were hospitalized OR are moderately to severely immunocompromised:  More than 20 days since symptoms first appeared  More than 24 hours fever free without medications  Symptoms have improved    If you had no symptoms but tested positive:  More than 5 days since the date of the first positive test (20 days if moderately to severely immunocompromised). If you develop symptoms, then use the guidelines above.  Continue to wear a mask around  others for 5 additional days.      Contact Tracing    As one of the next steps, you will receive a call or text from the [place] [facility] ([facility]) COVID-19 Tracing Team. See the contact information below so you know not to ignore the health departments call. It is important that you contact them back immediately so they can help.      Contact Tracer Number:  [phone]  Caller ID for most carriers: [place] Dept [facility]     What is contact tracing?  Contact tracing is a process that helps identify everyone who has been in close contact with an infected person. Contact tracers let those people know they may have been exposed and guide them on next steps. Confidentiality is important for everyone; no one will be told who may have exposed them to the virus.  Your involvement is important. The more we know about where and how this virus is spreading, the better chance we have at stopping it from spreading further.  What does exposure mean?  Exposure means you have been within 6 feet for more than 15 minutes with a person who has or had COVID-19.  What kind of questions do the contact tracers ask?  A contact tracer will confirm your basic contact information including name, address, phone number, and next of kin, as well as asking about any symptoms you may have had. Theyll also ask you how you think you may have gotten sick, such as places where you may have been exposed to the virus, and people you were with. Those names will never be shared with anyone outside of that call, and will only be used to help trace and stop the spread of the virus.   I have privacy concerns. How will the state use my information?  Your privacy about your health is important. All calls are completed using call centers that use the appropriate health privacy protection measures (HIPAA compliance), meaning that your patient information is safe. No one will ever ask you any questions related to immigration status. Your health comes  first.   Do I have to participate?  You do not have to participate, but we strongly encourage you to. Contact tracing can help us catch and control new outbreaks as theyre developing to keep your friends and family safe.   What if I dont hear from anyone?  If you dont receive a call within 24 hours, you can call the number above right away to inquire about your status. That line is open from 8:00 am - 8:00 p.m., 7 days a week.  Contact tracing saves lives! Together, we have the power to beat this virus and keep our loved ones and neighbors safe.    For more information see CDC link below.      https://www.cdc.gov/coronavirus/2019-ncov/hcp/guidance-prevent-spread.html#precautions        Sources:  [facility], [place] [facility] and [facility]           Sincerely,     XIANG Tran

## (undated) DEVICE — SHIELD COLLAGEN 12HR CORNEAL

## (undated) DEVICE — FILTER STRAW

## (undated) DEVICE — GOWN SURGICAL X-LARGE

## (undated) DEVICE — KIT GREY EYE

## (undated) DEVICE — Device

## (undated) DEVICE — KNIFE ANGLE 1MM

## (undated) DEVICE — SEE MEDLINE ITEM 157131

## (undated) DEVICE — SOL IRR BSS OPHTH 500ML STRL

## (undated) DEVICE — BLADE SURG BVL ANG COAX 2.4MM

## (undated) DEVICE — DRAPE STERI 32 X 50

## (undated) DEVICE — SHIELD EYE PLASTIC 3100G

## (undated) DEVICE — GARTER EYE ADULT